# Patient Record
Sex: MALE | Race: OTHER
[De-identification: names, ages, dates, MRNs, and addresses within clinical notes are randomized per-mention and may not be internally consistent; named-entity substitution may affect disease eponyms.]

---

## 2019-02-16 ENCOUNTER — HOSPITAL ENCOUNTER (INPATIENT)
Dept: HOSPITAL 54 - ER | Age: 59
LOS: 3 days | Discharge: HOME | DRG: 198 | End: 2019-02-19
Attending: INTERNAL MEDICINE | Admitting: INTERNAL MEDICINE
Payer: COMMERCIAL

## 2019-02-16 VITALS — DIASTOLIC BLOOD PRESSURE: 74 MMHG | SYSTOLIC BLOOD PRESSURE: 122 MMHG

## 2019-02-16 VITALS — SYSTOLIC BLOOD PRESSURE: 121 MMHG | DIASTOLIC BLOOD PRESSURE: 64 MMHG

## 2019-02-16 VITALS — WEIGHT: 180 LBS | BODY MASS INDEX: 26.66 KG/M2 | HEIGHT: 69 IN

## 2019-02-16 DIAGNOSIS — I10: ICD-10-CM

## 2019-02-16 DIAGNOSIS — I20.0: Primary | ICD-10-CM

## 2019-02-16 DIAGNOSIS — F17.200: ICD-10-CM

## 2019-02-16 DIAGNOSIS — Z79.899: ICD-10-CM

## 2019-02-16 DIAGNOSIS — E78.5: ICD-10-CM

## 2019-02-16 DIAGNOSIS — E11.9: ICD-10-CM

## 2019-02-16 DIAGNOSIS — J43.2: ICD-10-CM

## 2019-02-16 DIAGNOSIS — Z79.84: ICD-10-CM

## 2019-02-16 LAB
ALBUMIN SERPL BCP-MCNC: 3.8 G/DL (ref 3.4–5)
ALP SERPL-CCNC: 48 U/L (ref 46–116)
ALT SERPL W P-5'-P-CCNC: 53 U/L (ref 12–78)
AST SERPL W P-5'-P-CCNC: 27 U/L (ref 15–37)
BASOPHILS # BLD AUTO: 0.1 /CMM (ref 0–0.2)
BASOPHILS NFR BLD AUTO: 0.7 % (ref 0–2)
BILIRUB DIRECT SERPL-MCNC: 0.1 MG/DL (ref 0–0.2)
BILIRUB SERPL-MCNC: 0.3 MG/DL (ref 0.2–1)
BUN SERPL-MCNC: 15 MG/DL (ref 7–18)
CALCIUM SERPL-MCNC: 9 MG/DL (ref 8.5–10.1)
CHLORIDE SERPL-SCNC: 103 MMOL/L (ref 98–107)
CO2 SERPL-SCNC: 26 MMOL/L (ref 21–32)
CREAT SERPL-MCNC: 1.2 MG/DL (ref 0.6–1.3)
EOSINOPHIL NFR BLD AUTO: 3.5 % (ref 0–6)
GLUCOSE SERPL-MCNC: 216 MG/DL (ref 74–106)
HCT VFR BLD AUTO: 43 % (ref 39–51)
HGB BLD-MCNC: 14.6 G/DL (ref 13.5–17.5)
LYMPHOCYTES NFR BLD AUTO: 2.4 /CMM (ref 0.8–4.8)
LYMPHOCYTES NFR BLD AUTO: 29.5 % (ref 20–44)
MCHC RBC AUTO-ENTMCNC: 34 G/DL (ref 31–36)
MCV RBC AUTO: 90 FL (ref 80–96)
MONOCYTES NFR BLD AUTO: 0.7 /CMM (ref 0.1–1.3)
MONOCYTES NFR BLD AUTO: 8.8 % (ref 2–12)
NEUTROPHILS # BLD AUTO: 4.7 /CMM (ref 1.8–8.9)
NEUTROPHILS NFR BLD AUTO: 57.5 % (ref 43–81)
PLATELET # BLD AUTO: 329 /CMM (ref 150–450)
POTASSIUM SERPL-SCNC: 3.9 MMOL/L (ref 3.5–5.1)
PROT SERPL-MCNC: 6.9 G/DL (ref 6.4–8.2)
RBC # BLD AUTO: 4.8 MIL/UL (ref 4.5–6)
SODIUM SERPL-SCNC: 138 MMOL/L (ref 136–145)
WBC NRBC COR # BLD AUTO: 8.2 K/UL (ref 4.3–11)

## 2019-02-16 PROCEDURE — A9502 TC99M TETROFOSMIN: HCPCS

## 2019-02-16 PROCEDURE — G0378 HOSPITAL OBSERVATION PER HR: HCPCS

## 2019-02-16 RX ADMIN — METOPROLOL TARTRATE SCH MG: 25 TABLET, FILM COATED ORAL at 18:23

## 2019-02-16 RX ADMIN — METFORMIN HYDROCHLORIDE SCH MG: 500 TABLET, FILM COATED ORAL at 18:22

## 2019-02-16 RX ADMIN — AMLODIPINE BESYLATE SCH MG: 10 TABLET ORAL at 18:22

## 2019-02-16 NOTE — NUR
PT BIBRA FROM HOME FOR CP X1HR; TOOK NITRO PTA, PT [id number], PT ON MONITOR, VSS, 
NAD NOTED, PENDING ER PROVIDER KARISSA

## 2019-02-16 NOTE — NUR
TELE RN ADMISSION NOTES

RECEIVED PT FROM ER TO ROOM 111-2 VIA [employer].ALERT/ORIENTED X4,[place]ivehi SPEAKING,KNOWS 
ENGLISH.ON ROOM AIR,SATURATING WELL.NO SOB AND ACUTE DISTRESS NOTED.CAN AMBULATE WITH 
MINIMAL SUPERVISION.NOTED WITH PAIN  1/10 ON CHEST-LEFT.IV LINE IS ON RIGHT AC G18,SITE IS 
CLEAN,DRY AND INTACT.NO INFILTRATION NOTED.FAMILY IS AT BEDSIDE.SKIN ASSESSMENT IS DONE AND 
PHOTO HAS TAKEN,NOTED WITH OLD SURGICAL SCAR ON STOMACH,SKIN TEAR ON B./L LOWER LEG.VITAL 
SIGNS CHECKED,WNL. SAFETY IS MAINTAINED AT ALL TIMES.BED IS IN LOW POSITION AND LOCKED.CALL 
LIGHT IS WITHIN REACH.WILL CONTINUE TO MONITOR THE PT CLOSELY.

## 2019-02-16 NOTE — NUR
TELE RN OPENING NOTES



Received patient on bed, alert, oriented x 4. Family at bedside. Breathing even and 
unlabored. Not in any distress. Tolerating room air. No complaints as of this time. 
Requesting to smoke, smoking consent signed- in chart. IV access in RAC g18, intact and 
patent. Tele monitor in place, sinus rhythm 77. Safety measures in place; call bell within 
reach. Bed in low, locked position. Patient stable as endorsed by the morning RN. Will 
continue to monitor accordingly

## 2019-02-17 VITALS — SYSTOLIC BLOOD PRESSURE: 120 MMHG | DIASTOLIC BLOOD PRESSURE: 74 MMHG

## 2019-02-17 VITALS — DIASTOLIC BLOOD PRESSURE: 72 MMHG | SYSTOLIC BLOOD PRESSURE: 121 MMHG

## 2019-02-17 VITALS — SYSTOLIC BLOOD PRESSURE: 115 MMHG | DIASTOLIC BLOOD PRESSURE: 77 MMHG

## 2019-02-17 VITALS — DIASTOLIC BLOOD PRESSURE: 66 MMHG | SYSTOLIC BLOOD PRESSURE: 122 MMHG

## 2019-02-17 VITALS — DIASTOLIC BLOOD PRESSURE: 74 MMHG | SYSTOLIC BLOOD PRESSURE: 120 MMHG

## 2019-02-17 VITALS — SYSTOLIC BLOOD PRESSURE: 113 MMHG | DIASTOLIC BLOOD PRESSURE: 56 MMHG

## 2019-02-17 VITALS — SYSTOLIC BLOOD PRESSURE: 120 MMHG | DIASTOLIC BLOOD PRESSURE: 71 MMHG

## 2019-02-17 LAB
BASOPHILS # BLD AUTO: 0 /CMM (ref 0–0.2)
BASOPHILS NFR BLD AUTO: 0.4 % (ref 0–2)
BUN SERPL-MCNC: 16 MG/DL (ref 7–18)
CALCIUM SERPL-MCNC: 8.8 MG/DL (ref 8.5–10.1)
CHLORIDE SERPL-SCNC: 108 MMOL/L (ref 98–107)
CHOLEST SERPL-MCNC: 221 MG/DL (ref ?–200)
CO2 SERPL-SCNC: 24 MMOL/L (ref 21–32)
CREAT SERPL-MCNC: 1.2 MG/DL (ref 0.6–1.3)
EOSINOPHIL NFR BLD AUTO: 2.5 % (ref 0–6)
GLUCOSE SERPL-MCNC: 136 MG/DL (ref 74–106)
HCT VFR BLD AUTO: 42 % (ref 39–51)
HDLC SERPL-MCNC: 34 MG/DL (ref 40–60)
HGB BLD-MCNC: 13.9 G/DL (ref 13.5–17.5)
LDLC SERPL DIRECT ASSAY-MCNC: 159 MG/DL (ref 0–99)
LYMPHOCYTES NFR BLD AUTO: 1.4 /CMM (ref 0.8–4.8)
LYMPHOCYTES NFR BLD AUTO: 12.7 % (ref 20–44)
MAGNESIUM SERPL-MCNC: 1.7 MG/DL (ref 1.8–2.4)
MCHC RBC AUTO-ENTMCNC: 33 G/DL (ref 31–36)
MCV RBC AUTO: 89 FL (ref 80–96)
MONOCYTES NFR BLD AUTO: 0.8 /CMM (ref 0.1–1.3)
MONOCYTES NFR BLD AUTO: 7 % (ref 2–12)
NEUTROPHILS # BLD AUTO: 8.6 /CMM (ref 1.8–8.9)
NEUTROPHILS NFR BLD AUTO: 77.4 % (ref 43–81)
PHOSPHATE SERPL-MCNC: 3.2 MG/DL (ref 2.5–4.9)
PLATELET # BLD AUTO: 278 /CMM (ref 150–450)
POTASSIUM SERPL-SCNC: 4.3 MMOL/L (ref 3.5–5.1)
RBC # BLD AUTO: 4.71 MIL/UL (ref 4.5–6)
SODIUM SERPL-SCNC: 144 MMOL/L (ref 136–145)
TRIGL SERPL-MCNC: 165 MG/DL (ref 30–150)
WBC NRBC COR # BLD AUTO: 11.2 K/UL (ref 4.3–11)

## 2019-02-17 RX ADMIN — AMLODIPINE BESYLATE SCH MG: 10 TABLET ORAL at 08:56

## 2019-02-17 RX ADMIN — ASPIRIN 81 MG SCH MG: 81 TABLET ORAL at 08:56

## 2019-02-17 RX ADMIN — LOSARTAN POTASSIUM SCH MG: 50 TABLET, FILM COATED ORAL at 10:34

## 2019-02-17 RX ADMIN — METFORMIN HYDROCHLORIDE SCH MG: 500 TABLET, FILM COATED ORAL at 16:36

## 2019-02-17 RX ADMIN — MAGNESIUM SULFATE IN DEXTROSE SCH MLS/HR: 10 INJECTION, SOLUTION INTRAVENOUS at 11:26

## 2019-02-17 RX ADMIN — ATORVASTATIN CALCIUM SCH MG: 40 TABLET, FILM COATED ORAL at 08:57

## 2019-02-17 RX ADMIN — METFORMIN HYDROCHLORIDE SCH MG: 500 TABLET, FILM COATED ORAL at 08:57

## 2019-02-17 RX ADMIN — MAGNESIUM SULFATE IN DEXTROSE SCH MLS/HR: 10 INJECTION, SOLUTION INTRAVENOUS at 10:37

## 2019-02-17 RX ADMIN — ACETAMINOPHEN PRN MG: 325 TABLET ORAL at 16:34

## 2019-02-17 RX ADMIN — METOPROLOL TARTRATE SCH MG: 25 TABLET, FILM COATED ORAL at 08:57

## 2019-02-17 RX ADMIN — METOPROLOL TARTRATE SCH MG: 25 TABLET, FILM COATED ORAL at 16:46

## 2019-02-17 NOTE — NUR
TELE RN CLOSING NOTES



RECEIVED PT RESTING IN BED. ALERT/ORIENTED X4. ON ROOM AIR, SATURATING WELL. NO SOB AND 
ACUTE DISTRESS NOTED. CAN AMBULATE WITH MINIMAL SUPERVISION. NOTED WITH PAIN 0/10 ON CHEST, 
BACK, AND LEFT ARM. IV LINE IS ON RIGHT AC G18, SITE IS CLEAN, DRY AND INTACT. NO 
INFILTRATION NOTED. BED IS IN LOW POSITION AND LOCKED. CALL LIGHT IS WITHIN REACH. ALL NEEDS 
MET. ENDORSED TO NIGHT SHIFT NURSE FOR ROBBIE.

## 2019-02-17 NOTE — NUR
RN INITIAL NOTES



RECEIVED PT RESTING IN BED.FAMILY AT BED SIDE. ALERT/ORIENTED X4. ON ROOM AIR, SATURATING 
WELL. NO SOB AND ACUTE DISTRESS NOTED.  IV LINE IS ON RIGHT AC G18, SITE IS CLEAN, DRY AND 
INTACT. NO INFILTRATION NOTED. BED IS IN LOW POSITION AND LOCKED. CALL LIGHT IS WITHIN 
REACH. WILL CONTINUE TO MONITOR.

## 2019-02-17 NOTE — NUR
TELE RN OPENING NOTES



RECEIVED PT RESTING IN BED. ALERT/ORIENTED X4. ON ROOM AIR, SATURATING WELL. NO SOB AND 
ACUTE DISTRESS NOTED. CAN AMBULATE WITH MINIMAL SUPERVISION. NOTED WITH PAIN 0/10 ON CHEST, 
BACK, AND LEFT ARM. IV LINE IS ON RIGHT AC G18, SITE IS CLEAN, DRY AND INTACT. NO 
INFILTRATION NOTED. BED IS IN LOW POSITION AND LOCKED. CALL LIGHT IS WITHIN REACH. WILL 
CONTINUE TO MONITOR THE PT CLOSELY THROUGHOUT SHIFT.

## 2019-02-17 NOTE — NUR
TELE RN CLOSING NOTES



Patient resting in bed, alert, oriented x 4. Breathing even and unlabored. Not in any 
distress. Tolerating room air. No complaints of chest pain through the night. IV access in 
RAC g18, intact and patent. Tele monitor in place, sinus rhythm 88 with BBB. All needs 
attended to. Safety measures in place; call bell within reach. Bed in low, locked position. 
Will endorse ROBBIE to incoming RN

## 2019-02-18 VITALS — DIASTOLIC BLOOD PRESSURE: 69 MMHG | SYSTOLIC BLOOD PRESSURE: 125 MMHG

## 2019-02-18 VITALS — DIASTOLIC BLOOD PRESSURE: 61 MMHG | SYSTOLIC BLOOD PRESSURE: 121 MMHG

## 2019-02-18 VITALS — SYSTOLIC BLOOD PRESSURE: 116 MMHG | DIASTOLIC BLOOD PRESSURE: 72 MMHG

## 2019-02-18 VITALS — DIASTOLIC BLOOD PRESSURE: 68 MMHG | SYSTOLIC BLOOD PRESSURE: 124 MMHG

## 2019-02-18 VITALS — SYSTOLIC BLOOD PRESSURE: 138 MMHG | DIASTOLIC BLOOD PRESSURE: 75 MMHG

## 2019-02-18 VITALS — DIASTOLIC BLOOD PRESSURE: 84 MMHG | SYSTOLIC BLOOD PRESSURE: 138 MMHG

## 2019-02-18 LAB
BUN SERPL-MCNC: 15 MG/DL (ref 7–18)
CALCIUM SERPL-MCNC: 8.5 MG/DL (ref 8.5–10.1)
CHLORIDE SERPL-SCNC: 102 MMOL/L (ref 98–107)
CO2 SERPL-SCNC: 22 MMOL/L (ref 21–32)
CREAT SERPL-MCNC: 1.3 MG/DL (ref 0.6–1.3)
GLUCOSE SERPL-MCNC: 127 MG/DL (ref 74–106)
MAGNESIUM SERPL-MCNC: 1.8 MG/DL (ref 1.8–2.4)
POTASSIUM SERPL-SCNC: 4.3 MMOL/L (ref 3.5–5.1)
SODIUM SERPL-SCNC: 137 MMOL/L (ref 136–145)

## 2019-02-18 RX ADMIN — METOPROLOL TARTRATE SCH MG: 25 TABLET, FILM COATED ORAL at 16:37

## 2019-02-18 RX ADMIN — Medication SCH EACH: at 12:00

## 2019-02-18 RX ADMIN — SODIUM CHLORIDE PRN MLS/HR: 4.5 INJECTION, SOLUTION INTRAVENOUS at 13:37

## 2019-02-18 RX ADMIN — LOSARTAN POTASSIUM SCH MG: 50 TABLET, FILM COATED ORAL at 08:20

## 2019-02-18 RX ADMIN — ACETAMINOPHEN PRN MG: 325 TABLET ORAL at 13:34

## 2019-02-18 RX ADMIN — AMLODIPINE BESYLATE SCH MG: 10 TABLET ORAL at 08:19

## 2019-02-18 RX ADMIN — INSULIN HUMAN PRN UNIT: 100 INJECTION, SOLUTION PARENTERAL at 22:17

## 2019-02-18 RX ADMIN — ATORVASTATIN CALCIUM SCH MG: 40 TABLET, FILM COATED ORAL at 08:19

## 2019-02-18 RX ADMIN — Medication SCH EACH: at 16:37

## 2019-02-18 RX ADMIN — METFORMIN HYDROCHLORIDE SCH MG: 500 TABLET, FILM COATED ORAL at 08:20

## 2019-02-18 RX ADMIN — METFORMIN HYDROCHLORIDE SCH MG: 500 TABLET, FILM COATED ORAL at 16:35

## 2019-02-18 RX ADMIN — ASPIRIN 81 MG SCH MG: 81 TABLET ORAL at 08:20

## 2019-02-18 RX ADMIN — Medication SCH EACH: at 22:25

## 2019-02-18 RX ADMIN — METOPROLOL TARTRATE SCH MG: 25 TABLET, FILM COATED ORAL at 08:20

## 2019-02-18 NOTE — NUR
TELE RN CLOSING NOTES



PT RESTING IN BED. ALERT/ORIENTED X4. ON ROOM AIR, SATURATING WELL. NO SOB AND ACUTE 
DISTRESS NOTED.NO CP REPORTED.  IV LINE IS ON RIGHT AC G18, SITE IS CLEAN, DRY AND INTACT. 
BED  IN LOW POSITION AND LOCKED. CALL LIGHT IS WITHIN REACH. ALL NEEDS MET. WILL ENDORSE TO 
AM SHIFT NURSE FOR ROBBIE.

## 2019-02-18 NOTE — NUR
TELE RN OPENING NOTES



RECEIVED PT RESTING IN BED. ALERT/ORIENTED X4. ON ROOM AIR, SATURATING WELL. NO SOB AND 
ACUTE DISTRESS NOTED. CAN AMBULATE WITH MINIMAL SUPERVISION. NO CHEST PAIN NOTED. IV LINE IS 
ON RIGHT AC G18, SITE IS CLEAN, DRY AND INTACT. NO INFILTRATION NOTED. BED IS IN LOW 
POSITION AND LOCKED. CALL LIGHT IS WITHIN REACH.SRX2. WILL CONTINUE TO MONITOR .

## 2019-02-18 NOTE — NUR
RN NOTES



RECEIVED PATIENT AWAKE, HOB ELEVATED, ON ROOM AIR AND TOLERATED WELL. PATIENT IS ALERT AND 
ORIENTED X4, DENIES PAIN NAUSEA AND VOMITING AT THIS TIME. IV ACCESS ON LEFT AC PATENT AND 
INTACT WITH ONGOING IVF INFUSING WELL. PLAN OF CARE DISCUSSED WITH THE PATIENT AND FAMILY AT 
BEDSIDE AND VERBALIZED UNDERSTANDING. KEPT PATIENT COMFORTABLE AND ATTENDED WITH CALL LIGHT 
WITHIN REACH. WILL CONTINUE TO MONITOR PATIENT.

## 2019-02-18 NOTE — NUR
TELE RN NOTE

GLUCOPHAGE HOLD BECAUSE PATIENT HA POOR PO INTAKE.REFUSED FLU VACCINE BECAUSE PATIENT STATED 
FEELS LIKE HE IS GETTING SICK AND COLD.HE WILL F/U  WITH HIS PCP.GOT CALL FROM  
TO HOLD DISCHARGE ,NPO POST MIDNIGHT.STRESS TEST TOMORROW.ENDORSED TO PM NURSE FOR ROBBIE.

## 2019-02-19 VITALS — DIASTOLIC BLOOD PRESSURE: 66 MMHG | SYSTOLIC BLOOD PRESSURE: 143 MMHG

## 2019-02-19 VITALS — DIASTOLIC BLOOD PRESSURE: 72 MMHG | SYSTOLIC BLOOD PRESSURE: 130 MMHG

## 2019-02-19 VITALS — DIASTOLIC BLOOD PRESSURE: 62 MMHG | SYSTOLIC BLOOD PRESSURE: 109 MMHG

## 2019-02-19 VITALS — SYSTOLIC BLOOD PRESSURE: 116 MMHG | DIASTOLIC BLOOD PRESSURE: 50 MMHG

## 2019-02-19 VITALS — SYSTOLIC BLOOD PRESSURE: 143 MMHG | DIASTOLIC BLOOD PRESSURE: 81 MMHG

## 2019-02-19 VITALS — DIASTOLIC BLOOD PRESSURE: 64 MMHG | SYSTOLIC BLOOD PRESSURE: 110 MMHG

## 2019-02-19 RX ADMIN — Medication SCH EACH: at 12:06

## 2019-02-19 RX ADMIN — METOPROLOL TARTRATE SCH MG: 25 TABLET, FILM COATED ORAL at 17:41

## 2019-02-19 RX ADMIN — METOPROLOL TARTRATE SCH MG: 25 TABLET, FILM COATED ORAL at 13:16

## 2019-02-19 RX ADMIN — METFORMIN HYDROCHLORIDE SCH MG: 500 TABLET, FILM COATED ORAL at 09:00

## 2019-02-19 RX ADMIN — Medication SCH EACH: at 17:39

## 2019-02-19 RX ADMIN — Medication SCH EACH: at 06:24

## 2019-02-19 RX ADMIN — METOPROLOL TARTRATE SCH MG: 25 TABLET, FILM COATED ORAL at 10:10

## 2019-02-19 RX ADMIN — ASPIRIN 81 MG SCH MG: 81 TABLET ORAL at 10:10

## 2019-02-19 RX ADMIN — ATORVASTATIN CALCIUM SCH MG: 40 TABLET, FILM COATED ORAL at 10:10

## 2019-02-19 RX ADMIN — INSULIN HUMAN PRN UNIT: 100 INJECTION, SOLUTION PARENTERAL at 12:09

## 2019-02-19 RX ADMIN — METFORMIN HYDROCHLORIDE SCH MG: 500 TABLET, FILM COATED ORAL at 17:41

## 2019-02-19 RX ADMIN — AMLODIPINE BESYLATE SCH MG: 10 TABLET ORAL at 10:10

## 2019-02-19 RX ADMIN — LOSARTAN POTASSIUM SCH MG: 50 TABLET, FILM COATED ORAL at 10:10

## 2019-02-19 RX ADMIN — SODIUM CHLORIDE PRN MLS/HR: 4.5 INJECTION, SOLUTION INTRAVENOUS at 03:50

## 2019-02-19 NOTE — NUR
TELE RN OPENING NOTES



RECEIVED PT RESTING IN BED. ALERT/ORIENTED X4. ON ROOM AIR, SATURATING WELL. NO SOB AND 
ACUTE DISTRESS NOTED. CAN AMBULATE WITH MINIMAL SUPERVISION. NO CHEST PAIN NOTED. IV LINE IS 
ON L AC G20, SITE IS CLEAN, DRY AND INTACT. NO INFILTRATION NOTED WITH 1/2 NS @80CC/HR. ON 
TELE MONITOR WITH HR OF 77.BED IS IN LOW POSITION AND LOCKED. CALL LIGHT IS WITHIN 
REACH.SRX2. WILL CONTINUE TO MONITOR .

## 2019-02-19 NOTE — NUR
TELE RN DISCHARGE NOTE

PATIENT D/C HOME IN STABLE CONDITION.VITAL SIGNS STABLE .NO SOB NO DISTRESS NOTED AT THIS 
TIME.EXIT CARE GIVEN TO PATIENT AND DAUGHTER.VERBALIZED UNDERSTANDING.ANSWERED ALL THE 
QUESTIONS.PATIENT REFUSED BODY CHECK.ALL DISCHARGE PAPER WORK AND BELONGINGS TAKEN.IV 
REMOVED .MINIMAL BLEEDING NOTED.PRESSURE DRESSING APPLIED.NO C/O ANY PAIN OR CHEST PAIN.LEFT 
WITH SON IN LOW AND DAUGHTER.

## 2019-02-19 NOTE — NUR
TELE RN NOTE

PATIENT PICKED UP FOR STRESS TEST AT 0815 IN STABLE CONDITION,INFORMED CONSENT RECEIVED FROM 
PATIENT.RETURN TO THE UNIT AT 0945 IN STABLE CONDITION.POOR PO INTAKE.HOLD MEDICATION 
GLUCOPHAGE.WILL CONTINUE TO MONITOR.

## 2019-02-19 NOTE — NUR
TELE RN NOTE

SEEN BY EVA MCKNIGHT GOT ORDER FOR DISCHARGE TO HOME.PATIENT MADE AWARE.PATIENT REFUSED FLU 
VACCINE.WILL F/U WITH PRIMARY DOCTOR.

## 2019-02-19 NOTE — NUR
RN Notes



Patient slept well overnight, vital signs stable, afebrile. Denies any pain and discomfort, 
on room air and tolerated well. Telemonitor reads Sinus Rhythm with heart rate at 80. Kept 
patient on NPO for stress test scheduled today. Ambulatory with steady gait. Voiding well. 
All needs attended. Will endorsed to morning RN for continuity of care.

## 2022-03-06 ENCOUNTER — HOSPITAL ENCOUNTER (EMERGENCY)
Dept: HOSPITAL 54 - ER | Age: 62
Discharge: HOME | End: 2022-03-06
Payer: COMMERCIAL

## 2022-03-06 VITALS — SYSTOLIC BLOOD PRESSURE: 135 MMHG | DIASTOLIC BLOOD PRESSURE: 69 MMHG

## 2022-03-06 VITALS — WEIGHT: 180 LBS | BODY MASS INDEX: 28.93 KG/M2 | HEIGHT: 66 IN

## 2022-03-06 DIAGNOSIS — I10: ICD-10-CM

## 2022-03-06 DIAGNOSIS — E11.9: ICD-10-CM

## 2022-03-06 DIAGNOSIS — Z79.899: ICD-10-CM

## 2022-03-06 DIAGNOSIS — Z79.84: ICD-10-CM

## 2022-03-06 DIAGNOSIS — H60.8X1: Primary | ICD-10-CM
